# Patient Record
Sex: MALE | Race: WHITE | NOT HISPANIC OR LATINO | URBAN - METROPOLITAN AREA
[De-identification: names, ages, dates, MRNs, and addresses within clinical notes are randomized per-mention and may not be internally consistent; named-entity substitution may affect disease eponyms.]

---

## 2017-06-28 ENCOUNTER — OUTPATIENT (OUTPATIENT)
Dept: OUTPATIENT SERVICES | Facility: HOSPITAL | Age: 37
LOS: 1 days | End: 2017-06-28
Payer: MEDICARE

## 2017-06-28 VITALS
RESPIRATION RATE: 14 BRPM | OXYGEN SATURATION: 98 % | DIASTOLIC BLOOD PRESSURE: 80 MMHG | TEMPERATURE: 97 F | SYSTOLIC BLOOD PRESSURE: 137 MMHG | WEIGHT: 171.96 LBS | HEART RATE: 70 BPM | HEIGHT: 64 IN

## 2017-06-28 DIAGNOSIS — I66.9 OCCLUSION AND STENOSIS OF UNSPECIFIED CEREBRAL ARTERY: ICD-10-CM

## 2017-06-28 PROCEDURE — 33284: CPT

## 2017-06-28 PROCEDURE — 93005 ELECTROCARDIOGRAM TRACING: CPT

## 2017-06-28 PROCEDURE — 93010 ELECTROCARDIOGRAM REPORT: CPT

## 2017-06-28 RX ORDER — CHOLECALCIFEROL (VITAMIN D3) 125 MCG
1 CAPSULE ORAL
Qty: 0 | Refills: 0 | COMMUNITY

## 2017-06-28 RX ORDER — SODIUM CHLORIDE 9 MG/ML
3 INJECTION INTRAMUSCULAR; INTRAVENOUS; SUBCUTANEOUS EVERY 8 HOURS
Qty: 0 | Refills: 0 | Status: DISCONTINUED | OUTPATIENT
Start: 2017-06-28 | End: 2017-07-13

## 2017-06-28 NOTE — H&P CARDIOLOGY - HISTORY OF PRESENT ILLNESS
35 yo male PMH CVA, Down syndrome HLD, PFO closure ([facility] with Dr. Gasca), psoriasis presents today for loop explant. Patient denies chest pain, palpitations, SOB. No events seen other than bradycardia. Loop implanted on 10/2015

## 2017-07-05 ENCOUNTER — NON-APPOINTMENT (OUTPATIENT)
Age: 37
End: 2017-07-05

## 2017-07-05 ENCOUNTER — APPOINTMENT (OUTPATIENT)
Dept: ELECTROPHYSIOLOGY | Facility: CLINIC | Age: 37
End: 2017-07-05

## 2017-07-05 VITALS
HEART RATE: 61 BPM | BODY MASS INDEX: 30.04 KG/M2 | DIASTOLIC BLOOD PRESSURE: 77 MMHG | SYSTOLIC BLOOD PRESSURE: 130 MMHG | WEIGHT: 175 LBS | OXYGEN SATURATION: 98 %

## 2019-05-29 ENCOUNTER — APPOINTMENT (OUTPATIENT)
Dept: NEUROLOGY | Facility: CLINIC | Age: 39
End: 2019-05-29
Payer: MEDICARE

## 2019-05-29 VITALS
HEIGHT: 64 IN | BODY MASS INDEX: 30.9 KG/M2 | HEART RATE: 62 BPM | DIASTOLIC BLOOD PRESSURE: 77 MMHG | WEIGHT: 181 LBS | SYSTOLIC BLOOD PRESSURE: 130 MMHG

## 2019-05-29 DIAGNOSIS — Q90.9 DOWN SYNDROME, UNSPECIFIED: ICD-10-CM

## 2019-05-29 DIAGNOSIS — E78.5 HYPERLIPIDEMIA, UNSPECIFIED: ICD-10-CM

## 2019-05-29 PROCEDURE — 99205 OFFICE O/P NEW HI 60 MIN: CPT

## 2019-05-29 NOTE — DISCUSSION/SUMMARY
[Antithrombotic therapy with ___] : antithrombotic therapy with  [unfilled] [FreeTextEntry1] : Impression:\par Patient with 3 events of loss of consciousness etiology unknown. Cardiac work up completed. Brain MRI 2019 disc reviewed in office and shows no evidence of new stroke. Same areas of gliosis right temporoparietal, left parietal, left temporal and cerebellar as seen in last MRI from 2016. Etiology of symptoms high suspicion for seizures. \par \par Plan:\par 1. Initiate Keppra 500mg BID I advice mother and attendant regarding most common side effects including dizziness, somnolence and erratic behavior.\par 2. I advice precautions such as not being alone for prolonged showers, in any water bodies, or situation that if the patient collapses may endanger his life \par 3. EEG awake and asleep \par 4. Evaluation by Epilepsy specialist\par 5. CBC, CMP, TSH, T3, U/A\par 6. Lipid panel to follow up by PCP to adjust Atorvastatin dose LDL goal <70\par 7. Get MRI and MRA official results \par 8. BP control goal <140/90, currently on goal\par 9. Exercise and weight loss\par 10. Tight glycemic control needs follow up for HbA1C\par 11. As for stroke may be followed accordingly if needed\par \par  [Lipid Lowering Therapy] : lipid lowering therapy [Intensive Blood Pressure Control] : intensive blood pressure control [Goals and Counseling] : I have reviewed the goals of stroke risk factor modification. I counseled the patient on measures to reduce stroke risk, including the importance of medication compliance, risk factor control, exercise, healthy diet and avoidance of smoking. I reviewed stroke warning signs and symptoms and appropriate actions to take if such occur. [Patient encouraged to discuss with Primary MD] : I encouraged the patient to discuss these important issues with ~his/her~ primary care doctor

## 2019-05-29 NOTE — REASON FOR VISIT
[Initial Evaluation] : an initial evaluation [Family Member] : family member [Other: _____] : [unfilled] [FreeTextEntry1] : 3 episodes of loss of consciousness

## 2019-05-29 NOTE — HISTORY OF PRESENT ILLNESS
[FreeTextEntry1] : This is a 39y/o male patient with past medical history of Down Syndrome, hyperlipidemia, and ischemic stroke 2015 evaluated at [facility] bilateral middle cerebral artery territory and cerebellar (cerebellar, right perisylvian temporoparietal, left fronto parietal) last evaluated by Dr. Libman \par Concerning secondary stroke prevention measures he is on ASA 81 mg and Atorvastatin  80mg daily. \par Today he comes after 3 events of loss of consciousness for which he was evaluated at [facility] Jan 28,2019. As per attendant from the patients residency group and mother none of the events where witnessed. During the afternoon he collapse and was unresponsive for minutes losing bowel sphincter control. He apparently was taken for a shower and had two episodes of syncope in the shower that prompted a friend of the patient to call EMS. Patient does not recall the events. As I tried to further inquire details about the event patient became anxious and mother too.\par Since January 2019 they denied any other neurological events. \par He has completed cardiac work up for syncope so far unrevealing. As for metabolic etiologies or BP at the time he was evaluated I don’t have the specific details. \par \par Of note he had PFO repaired. LOOP recorder was discontinued. Off Coumadin.\par

## 2019-05-29 NOTE — PHYSICAL EXAM
[FreeTextEntry1] : Neurologic examination:\par Mental status:\par The patient is alert, attentive, and oriented. Speech is dysarthric but fluent with good repetition, comprehension, and naming. \par \par Cranial nerves:\par CN II: Visual fields difficult to asses. \par CN III, IV, VI: At primary gaze, there is no eye deviation.EOMI. No ptosis. \par CN V: Not tested \par CN VII: Face is symmetric with normal eye closure and smile.\par CN VII: Hearing is normal to voice tone\par CN IX, X: . Phonation is normal.\par CN XI: Head turning and shoulder shrug are intact\par CN XII: Tongue is midline with normal movements and no atrophy.\par \par Motor:\par \par There is no pronator drift of out-stretched arms. Muscle bulk and tone are normal. Strength is full bilaterally.\par 	Deltoid	Biceps	Triceps	Hand \par L	5	5	5	5	\par R	5	5	5	5		\par  Hip flexion Hip extension	Knee flexion Knee extension\par L	5	5	5		 5\par R	5	5	5	                 5\par \par \par Sensory:\par Light touch intact in all 4 extremities. \par \par \par Coordination: No nystagmus\par \par Gait/Stance:\par Posture is normal. Gait is steady with normal steps, base, arm swing, and turning. \par \par

## 2019-07-29 ENCOUNTER — APPOINTMENT (OUTPATIENT)
Dept: NEUROLOGY | Facility: CLINIC | Age: 39
End: 2019-07-29
Payer: MEDICARE

## 2019-07-29 PROCEDURE — 95816 EEG AWAKE AND DROWSY: CPT

## 2019-07-29 PROCEDURE — 99215 OFFICE O/P EST HI 40 MIN: CPT

## 2019-07-29 NOTE — DISCUSSION/SUMMARY
[FreeTextEntry1] : Concern for cluster of seizures 1/2019 p strokes in 2015\par left insular and right temporo-parietal lesions\par ddx includes syncope.  cardiac w/u neg.\par REEG today abn, but no spikes.\par \par Cont LEV for now\par restrict unsupervised activities, bathing alone.\par Agree with CEEG x 48hrs\par mother inquired about other methods of monitoring for seizures, embrace watch considered.\par risk of death from seizures is present, though not predictable.\par compliance with Rx recommended.\par discussed safety recommendations.\par  [Symptomatic] : symptomatic [Focal] : focal [Safety Recommendations] : The patient was advised in regards to the risk of seizures and general seizure safety recommendations including not to be bathing alone, climbing to high places and operating heavy machinery. [Compliance with Medications] : The importance of compliance with medications was reinforced. [Medication Side Effects] : High frequency and serious potential medication adverse effects were reviewed with the patient, including but not exclusive to psychiatric effects.  Information sheets on medication side effects were made available to the patient in our clinic.  The patient or advocate agrees to notify us for any concerns.

## 2019-07-29 NOTE — HISTORY OF PRESENT ILLNESS
[FreeTextEntry1] : Per review of records, discussion from home:\par \par 39y/o male with PMHX of Down Syndrome, hyperlipidemia, and ischemic stroke in 2015 evaluated at [facility] bilateral middle cerebral artery territory and cerebellar (cerebellar, right perisylvian temporoparietal, left fronto parietal) w/ complete right sided hemiplegia and left gaze deviation p TPA.  on ASA 81 mg and Atorvastatin 80mg daily. \par \rosie Had three events of loss of consciousness for which he was evaluated at [facility] Jan 28, 2019. none of the events where witnessed. During the afternoon he collapse and was unresponsive for minutes, losing bowel control. He apparently was taken for a shower and had two episodes of LOC in the shower that prompted a friend of the patient to call EMS. Patient does not recall the events/amnestic. \par Since January 2019 they denied any other neurological events. \par \par He has completed cardiac work up for syncope so far unrevealing.  Of note he had PFO repaired. LOOP recorder was discontinued. \par

## 2019-07-29 NOTE — PHYSICAL EXAM
[FreeTextEntry1] : Neurologic examination:\par \par down's facies, calm, attentive\par \par Mental status:\par The patient is alert, attentive, and oriented. Speech is dysarthric but fluent with good repetition, comprehension, and naming 5/5. \par \par Cranial nerves:\par CN II: Visual fields difficult to asses. \par CN III, IV, VI: At primary gaze, there is no eye deviation.EOMI. No ptosis. \par CN V: Not tested \par CN VII: Face is symmetric with normal eye closure and smile.\par CN VII: Hearing is normal to voice tone\par CN IX, X: . Phonation is normal.\par CN XI: Head turning and shoulder shrug are intact\par CN XII: Tongue is midline with normal movements and no atrophy.\par \par Motor:\par There is no pronator drift of out-stretched arms. Muscle bulk and tone are normal. Strength is full bilaterally.\par 	Deltoid	Biceps	Triceps	Hand \par L	5	5	5	5	\par R	5	5	5	5		\par  Hip flexion Hip extension	Knee flexion Knee extension\par L	5	5	5		 5\par R	5	5	5	                 5\par \par Sensory:\par Light touch intact in all 4 extremities. \par \par Coordination: No nystagmus\par \par Gait/Stance:\par Posture is normal. Gait is steady with normal steps, base, arm swing, and turning. \par

## 2019-07-29 NOTE — DATA REVIEWED
[de-identified] : 2016 Rt posterior temporal, and left insular lesions. [de-identified] : FRITZG 2019 Mod background slowing, max right temporal.

## 2019-07-31 ENCOUNTER — OTHER (OUTPATIENT)
Age: 39
End: 2019-07-31

## 2019-09-03 ENCOUNTER — APPOINTMENT (OUTPATIENT)
Dept: NEUROLOGY | Facility: CLINIC | Age: 39
End: 2019-09-03
Payer: MEDICARE

## 2019-09-03 PROCEDURE — 95816 EEG AWAKE AND DROWSY: CPT

## 2019-09-04 PROCEDURE — 95953: CPT

## 2019-09-05 PROCEDURE — 95953: CPT

## 2019-11-01 ENCOUNTER — MEDICATION RENEWAL (OUTPATIENT)
Age: 39
End: 2019-11-01

## 2020-01-22 ENCOUNTER — APPOINTMENT (OUTPATIENT)
Dept: NEUROLOGY | Facility: CLINIC | Age: 40
End: 2020-01-22
Payer: MEDICARE

## 2020-01-22 VITALS
HEART RATE: 68 BPM | WEIGHT: 188 LBS | DIASTOLIC BLOOD PRESSURE: 76 MMHG | HEIGHT: 64 IN | BODY MASS INDEX: 32.1 KG/M2 | SYSTOLIC BLOOD PRESSURE: 112 MMHG

## 2020-01-22 PROCEDURE — 99214 OFFICE O/P EST MOD 30 MIN: CPT

## 2020-01-27 ENCOUNTER — FORM ENCOUNTER (OUTPATIENT)
Age: 40
End: 2020-01-27

## 2020-01-28 ENCOUNTER — OUTPATIENT (OUTPATIENT)
Dept: OUTPATIENT SERVICES | Facility: HOSPITAL | Age: 40
LOS: 1 days | End: 2020-01-28
Payer: MEDICARE

## 2020-01-28 ENCOUNTER — APPOINTMENT (OUTPATIENT)
Dept: MRI IMAGING | Facility: CLINIC | Age: 40
End: 2020-01-28
Payer: MEDICARE

## 2020-01-28 DIAGNOSIS — G81.91 HEMIPLEGIA, UNSPECIFIED AFFECTING RIGHT DOMINANT SIDE: ICD-10-CM

## 2020-01-28 DIAGNOSIS — I63.9 CEREBRAL INFARCTION, UNSPECIFIED: ICD-10-CM

## 2020-01-28 PROCEDURE — 70551 MRI BRAIN STEM W/O DYE: CPT | Mod: 26

## 2020-01-28 PROCEDURE — 70551 MRI BRAIN STEM W/O DYE: CPT

## 2020-01-29 NOTE — HISTORY OF PRESENT ILLNESS
[FreeTextEntry1] : Per records from home, feels well,  walking no change,  no falls.  no seizures.\par With mother and home care worker today.  no weakness\par Memory ok, thinking stable per mother/pt.\par \par PMHx:\par 40y/o male with PMHX of Down Syndrome, hyperlipidemia, and ischemic stroke in 2015 evaluated at [facility] bilateral middle cerebral artery territory and cerebellar (cerebellar, right perisylvian temporoparietal, left fronto parietal) w/ complete right sided hemiplegia and left gaze deviation p TPA.  on ASA 81 mg and Atorvastatin 80mg daily. \par \par Had three events of loss of consciousness for which he was evaluated at [facility] Jan 28, 2019. none of the events where witnessed. During the afternoon he collapse and was unresponsive for minutes, losing bowel control. He apparently was taken for a shower and had two episodes of LOC in the shower that prompted a friend of the patient to call EMS. Patient does not recall the events/amnestic. \par Since January 2019 they denied any other neurological events. \par \par He has completed cardiac work up for syncope so far unrevealing.  Of note he had PFO repaired. LOOP recorder was discontinued. \par

## 2020-01-29 NOTE — DISCUSSION/SUMMARY
[Safety Recommendations] : The patient was advised in regards to the risk of seizures and general seizure safety recommendations including not to be bathing alone, climbing to high places and operating heavy machinery. [Symptomatic] : symptomatic [Focal] : focal [Compliance with Medications] : The importance of compliance with medications was reinforced. [Medication Side Effects] : High frequency and serious potential medication adverse effects were reviewed with the patient, including but not exclusive to psychiatric effects.  Information sheets on medication side effects were made available to the patient in our clinic.  The patient or advocate agrees to notify us for any concerns. [FreeTextEntry1] : Down's syndrome.  \par Concern for cluster of seizures 1/2019 p strokes in 2015\par left insular and right temporo-parietal lesions on MRI, atrophy.\par ddx includes syncope.  cardiac w/u neg.\par REEG abn, slowing, but no spikes.\par CEEG x 48hrs\par \par Cont LEV for now low dose\par restrict unsupervised activities, bathing alone.\par mother inquired about other methods of monitoring for seizures, embrace watch considered.\par risk of death from seizures is present, though not predictable.\par compliance with Rx recommended.\par discussed safety recommendations.\par \par Recent incontinence, no AMS/memory decline, or gait abn.  \par MRI to evaluate for ventriculomegaly/NPH.\par \par Continue on ASA, lipitor, LEV.\par

## 2020-01-29 NOTE — PHYSICAL EXAM
[FreeTextEntry1] : Neurologic examination:\par \par down's facies, calm, attentive\par \par Mental status:\par The patient is alert, attentive, and oriented. Speech is dysarthric but fluent with good repetition, comprehension, and naming 5/5.  spells [place]LD M[place]LD, unable to do backwards 1/5. recall 2/3\par \par Cranial nerves:\par CN II: Visual fields difficult to asses. \par CN III, IV, VI: At primary gaze, there is no eye deviation.EOMI. No ptosis. \par CN V: Not tested \par CN VII: Face is symmetric with normal eye closure and smile.\par CN VII: Hearing is normal to voice tone\par CN IX, X: . Phonation is normal.\par CN XI: Head turning and shoulder shrug are intact\par CN XII: Tongue is midline with normal movements and no atrophy.\par \par Motor:\par There is no pronator drift of out-stretched arms. Muscle bulk and tone are normal. Strength is full bilaterally.\par 	Deltoid	Biceps	Triceps	Hand \par L	5	5	5	5	\par R	5	5	5	5		\par  Hip flexion Hip extension	Knee flexion Knee extension\par L	5	5	5		 5\par R	5	5	5	                 5\par \par Sensory:\par Light touch intact in all 4 extremities. \par \par Coordination: No nystagmus\par \par Gait/Stance:\par Posture is normal. Gait is steady with normal steps, base, arm swing, and turning. \par

## 2020-01-29 NOTE — DATA REVIEWED
[de-identified] : 2016 Rt posterior temporal, and left insular lesions. [de-identified] : FRITZG 2019 Mod background slowing, max right temporal.

## 2020-07-22 ENCOUNTER — APPOINTMENT (OUTPATIENT)
Dept: NEUROLOGY | Facility: CLINIC | Age: 40
End: 2020-07-22
Payer: MEDICARE

## 2020-07-22 VITALS
SYSTOLIC BLOOD PRESSURE: 96 MMHG | BODY MASS INDEX: 30.56 KG/M2 | HEART RATE: 71 BPM | DIASTOLIC BLOOD PRESSURE: 58 MMHG | HEIGHT: 64 IN | WEIGHT: 179 LBS

## 2020-07-22 VITALS — TEMPERATURE: 98 F

## 2020-07-22 PROCEDURE — 99214 OFFICE O/P EST MOD 30 MIN: CPT

## 2020-07-22 NOTE — DISCUSSION/SUMMARY
[Focal] : focal [FreeTextEntry1] : Down's syndrome.  \par Concern for cluster of seizures 1/2019 p strokes in 2015\par left insular and right temporo-parietal lesions on MRI, atrophy.\par ddx includes syncope.  cardiac w/u neg.\par REEG abn, slowing, but no spikes.\par CEEG x 48hrs\par \par Cont LEV for now low dose\par restrict unsupervised activities, bathing alone.\par mother inquired about other methods of monitoring for seizures, embrace watch considered.\par risk of death from seizures is present, though not predictable.\par compliance with Rx recommended.\par discussed safety recommendations.\par \par Recent incontinence, no back pain, no AMS/memory decline, or gait abn.  \par MRI to evaluate for ventriculomegaly/NPH 1/2020 neg.\par \par Continue on ASA, lipitor, LEV.\par  [Symptomatic] : symptomatic [Medication Side Effects] : High frequency and serious potential medication adverse effects were reviewed with the patient, including but not exclusive to psychiatric effects.  Information sheets on medication side effects were made available to the patient in our clinic.  The patient or advocate agrees to notify us for any concerns. [Compliance with Medications] : The importance of compliance with medications was reinforced. [Safety Recommendations] : The patient was advised in regards to the risk of seizures and general seizure safety recommendations including not to be bathing alone, climbing to high places and operating heavy machinery.

## 2020-07-22 NOTE — DATA REVIEWED
[de-identified] : 2016 Rt posterior temporal, and left insular lesions. [de-identified] : FRITZG 2019 Mod background slowing, max right temporal.

## 2020-07-22 NOTE — HISTORY OF PRESENT ILLNESS
[FreeTextEntry1] : Per records from home, feels well,  walking no change,  no falls.  no seizures.\par With mother and home care worker today.  no weakness\par Memory ok, thinking stable per mother/pt.\par Treadmill x 35min/day.\par 2 weeks ago, episode of fecal incontinence.  nl diet.  no use of miralax as of late. \par Sometimes doesn't feel need to go, and has BM.  no back pain, no pain in groin, leg motion/numbness issues.\par \par PMHx:\par 38y/o male with PMHX of Down Syndrome, hyperlipidemia, and ischemic stroke in 2015 evaluated at [facility] bilateral middle cerebral artery territory and cerebellar (cerebellar, right perisylvian temporoparietal, left fronto parietal) w/ complete right sided hemiplegia and left gaze deviation p TPA.  on ASA 81 mg and Atorvastatin 80mg daily. \par \rosie Had three events of loss of consciousness for which he was evaluated at [facility] Jan 28, 2019. none of the events where witnessed. During the afternoon he collapse and was unresponsive for minutes, losing bowel control. He apparently was taken for a shower and had two episodes of LOC in the shower that prompted a friend of the patient to call EMS. Patient does not recall the events/amnestic. \par Since January 2019 they denied any other neurological events. \par \par He has completed cardiac work up for syncope so far unrevealing.  Of note he had PFO repaired. LOOP recorder was discontinued. \par

## 2020-07-22 NOTE — PHYSICAL EXAM
[FreeTextEntry1] : Neurologic examination:\par \par down's facies, calm, attentive\par \par Mental status:  Orientation 5/5 time, 3/5 place (baseline)\par The patient is alert, attentive, and oriented. Speech is dysarthric but fluent with good repetition, comprehension, and naming 5/5.  spells [place], unable to do backwards 1/5. reg 2/3, multiple trials, recall 2-3/3\par \par Cranial nerves:\par CN II: Visual fields difficult to asses. \par CN III, IV, VI: At primary gaze, there is no eye deviation.EOMI. No ptosis. \par CN V: Not tested \par CN VII: Face is symmetric with normal eye closure and smile.\par CN VII: Hearing is normal to voice tone\par CN IX, X: . Phonation is normal.\par CN XI: Head turning and shoulder shrug are intact\par CN XII: Tongue is midline with normal movements and no atrophy.\par \par Motor:\par There is no pronator drift of out-stretched arms. Muscle bulk and tone are normal. Strength is full bilaterally.\par 	Deltoid	Biceps	Triceps	Hand \par L	5	5	5	5	\par R	5	5	5	5		\par  Hip flexion Hip extension	Knee flexion Knee extension\par L	5	5	5		 5\par R	5	5	5	                 5\par \par Sensory:\par Light touch intact in all 4 extremities. \par \par Coordination: No nystagmus/ slowed HTS/HT dexterity bilat\par \par Gait/Stance:\par Posture is normal. Gait is slowed but steady with deliberate steps, base, arm swing, and turning.  Unable to tandem, +romberg\par \par DTR bi, knee nl

## 2020-09-22 ENCOUNTER — APPOINTMENT (OUTPATIENT)
Dept: NEUROLOGY | Facility: CLINIC | Age: 40
End: 2020-09-22
Payer: MEDICARE

## 2020-09-22 VITALS
SYSTOLIC BLOOD PRESSURE: 119 MMHG | BODY MASS INDEX: 30.73 KG/M2 | DIASTOLIC BLOOD PRESSURE: 76 MMHG | HEIGHT: 64 IN | WEIGHT: 180 LBS | HEART RATE: 67 BPM

## 2020-09-22 VITALS — TEMPERATURE: 98 F

## 2020-09-22 PROCEDURE — 99214 OFFICE O/P EST MOD 30 MIN: CPT

## 2020-09-22 PROCEDURE — 95816 EEG AWAKE AND DROWSY: CPT

## 2020-09-22 NOTE — DISCUSSION/SUMMARY
[Focal] : focal [Symptomatic] : symptomatic [Safety Recommendations] : The patient was advised in regards to the risk of seizures and general seizure safety recommendations including not to be bathing alone, climbing to high places and operating heavy machinery. [Compliance with Medications] : The importance of compliance with medications was reinforced. [Medication Side Effects] : High frequency and serious potential medication adverse effects were reviewed with the patient, including but not exclusive to psychiatric effects.  Information sheets on medication side effects were made available to the patient in our clinic.  The patient or advocate agrees to notify us for any concerns. [FreeTextEntry1] : Down's syndrome.  \par Concern for cluster of seizures 1/2019 p strokes in 2015\par left insular and right temporo-parietal lesions on MRI, atrophy.\par ddx includes syncope.  cardiac w/u neg.\par REEG 7/2019 abn, bilateral slowing, but no spikes.\par CEEG x 48hrs 9/2019 Abnormal EEG in the awake, drowsy and asleep states.\par - Multiple push-buttons without abnormal EEG correlate. \par - Mild generalized slowing, most prominent bi-temporal and more on the right compared the left\par \par Episode of hypoglyemia to 40's with AMS.  Agree with FSG testing and endocrine eval.\par \par Cont LEV for now low dose\par restrict unsupervised activities, bathing alone.\par risk of death from seizures is present, though not predictable.\par compliance with Rx recommended.\par discussed safety recommendations.\par \par Recent incontinence, no back pain, no AMS/memory decline, or gait abn.  \par MRI to evaluate for ventriculomegaly/NPH 1/2020 neg.\par \par Continue on ASA, lipitor, LEV.\par \par f/u 4mo\par \par

## 2020-09-22 NOTE — DATA REVIEWED
[de-identified] : 2016 Rt posterior temporal, and left insular lesions. [de-identified] : FRITZG 2019 Mod background slowing, max right temporal.

## 2020-09-22 NOTE — PHYSICAL EXAM
[FreeTextEntry1] : Neurologic examination:\par \par down's facies, calm, attentive\par \par Mental status:  Orientation 5/5 time, 3/5 place (baseline)\par The patient is alert, attentive, and oriented. Speech is dysarthric but fluent with good repetition, comprehension, and naming 5/5.  spells [place], unable to do backwards 1/5. reg 2/3, multiple trials, recall 2-3/3.  recalls 3 items from last visit.\par \par Cranial nerves:\par CN II: Visual fields difficult to asses. \par CN III, IV, VI: At primary gaze, there is no eye deviation.EOMI. No ptosis. \par CN V: Not tested \par CN VII: Face is symmetric with normal eye closure and smile.\par CN VII: Hearing is normal to voice tone\par CN IX, X: . Phonation is normal.\par CN XI: Head turning and shoulder shrug are intact\par CN XII: Tongue is midline with normal movements and no atrophy.\par \par Motor:\par There is no pronator drift of out-stretched arms. Muscle bulk and tone are normal. Strength is full bilaterally.\par 	Deltoid	Biceps	Triceps	Hand \par L	5	5	5	5	\par R	5	5	5	5		\par  Hip flexion Hip extension	Knee flexion Knee extension\par L	5	5	5		 5\par R	5	5	5	                 5\par \par Sensory:\par Light touch intact in all 4 extremities. \par \par Coordination: No nystagmus/ slowed HTS/HT dexterity bilat\par \par Gait/Stance:\par Posture is normal. Gait is slowed but steady with deliberate steps, base, arm swing, and turning.  Unable to tandem, +romberg\par \par DTR bi, knee nl

## 2020-10-29 NOTE — HISTORY OF PRESENT ILLNESS
Continuity of Care Form    Patient Name: Izzy Enriquez   :  1945  MRN:  [id number]    Admit date:  10/27/2020  Discharge date:  11/3/20    Code Status Order: Full Code   Advance Directives:   Advance Care Flowsheet Documentation       Date/Time Healthcare Directive Type of Healthcare Directive Copy in [address] Agent's Name Healthcare Agent's Phone Number    10/27/20 1523  Yes, patient has an advance directive for healthcare treatment  Durable power of  for health care  No, copy requested from family -- -- --            Admitting Physician:  Samia Bean MD  PCP: Alem Joel DO    Discharging Nurse: Mushtaq -7 Unit/Room#: 7635/2989-B  Discharging Unit Phone Number: ***    Emergency Contact:   Extended Emergency Contact Information  Primary Emergency Contact: Laura Vazquez  Address: [address] Phone: [phone]  Mobile Phone: [phone]  Relation: Spouse   needed?  No  Secondary Emergency Contact: Josh Vazquez  Address: [address] [place] [address] Phone: [phone]  Relation: Child    Past Surgical History:  Past Surgical History:   Procedure Laterality Date    AV FISTULA CREATION Left     Dr. Maxine Castleman Left 2019    2 x Dr. Cristine Mukherjee, [facility]    HIP SURGERY Left 10/16/2020    HIP HEMIARTHROPLASTY performed by Marilee Juárez MD at [facility]      Aortobifemoral bypass for claudicatio - Dr. Emre Browning N/A 2020    EGD ESOPHAGOGASTRODUODENOSCOPY WITH ANTRAL BIOPSY performed by Bertha Gutiérrez MD at [facility] N/A 10/28/2020    EGD ESOPHAGOGASTRODUODENOSCOPY performed by Bertha Gutiérrez MD at [facility] ENDOSCOPY       Immunization History: There is no immunization history on file for this patient.     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    Fracture of femoral neck, left, closed ([facility] S72.002A    History of left hip hemiarthroplasty K02.187    Severe protein-calorie malnutrition POA E43    GI bleeding K92.2       Isolation/Infection:   Isolation            No Isolation          Patient Infection Status       Infection Onset Added Last Indicated Last Indicated By Review Planned Expiration Resolved Resolved By    None active    Resolved    COVID-19 Rule Out 10/28/20 10/28/20 10/28/20 COVID-19 (Ordered)   10/28/20 Rule-Out Test Resulted    COVID-19 Rule Out 10/20/20 10/20/20 10/20/20 COVID-19 (Ordered)   10/20/20 Rule-Out Test Resulted    COVID-19 Rule Out 20 Covid-19 Ambulatory (Ordered)   20 Rule-Out Test Resulted    COVID-19 20 Covid-19 Ambulatory   20     DETECTED 2020    COVID-19 Rule Out 20 COVID-19 (Ordered)   20 Rule-Out Test Resulted    DETECTED 2020            Nurse Assessment:  Last Vital Signs: BP (!) 122/57   Pulse 84   Temp 98.3 °F (36.8 °C) (Temporal)   Resp 16   Ht 5' 8\" (1.727 m)   Wt 139 lb 12.4 oz (63.4 kg)   SpO2 93%   BMI 21.25 kg/m²     Last documented pain score (0-10 scale): Pain Level: 6  Last Weight:   Wt Readings from Last 1 Encounters:   10/28/20 139 lb 12.4 oz (63.4 kg)     Mental Status:  oriented and alert    IV Access:  - PICC - site  R Basilic, insertion date: 10/17/20  - Dialysis Catheter  - site  left, insertion date: 10/16/20    Nursing Mobility/ADLs:  Walking   Dependent  Transfer  [address] [place] [address] Dr  Dependent  Med Delivery   whole    Wound Care Documentation and Therapy:  Wound 10/12/20 Arm Left;Posterior;Proximal (Active)   Number of days: 17       Wound 10/12/20 Arm Left;Posterior;Distal (Active)   Number of days: 17       Wound 10/12/20 Face Left;Upper above left eyebrow (Active)   Number of days: 17       Wound 10/27/20 Coccyx (Active)   Dressing/Treatment Pharmaceutical agent (see MAR) 10/29/20 0800   Wound Length (cm) 2.5 cm 10/28/20 0900   Wound Width (cm) 0.8 cm 10/28/20 0900   Wound Surface Area (cm^2) 2 cm^2 10/28/20 0900   Change in Wound Size % (l*w) 0 10/28/20 0900   Number of days: 1        Elimination:  Continence:   · Bowel: Yes  · Bladder: *** Anuric  Urinary Catheter: None   Colostomy/Ileostomy/Ileal Conduit: No       Date of Last BM: 11/3/20      Intake/Output Summary (Last 24 hours) at 10/29/2020 1206  Last data filed at 10/29/2020 0800  Gross per 24 hour   Intake 2933 ml   Output 2150 ml   Net 783 ml     I/O last 3 completed shifts: In: 5898 [I.V.:2248; Blood:355]  Out: 2150     Safety Concerns:     History of Falls (last 30 days) and At Risk for Falls    Impairments/Disabilities:      Vision and Hearing [place] and blind in L eye    Nutrition Therapy:  Current Nutrition Therapy:   - Oral Diet:  Renal    Routes of Feeding: Oral  Liquids: No Restrictions  Daily Fluid Restriction: no  Last Modified Barium Swallow with Video (Video Swallowing Test): not done    Treatments at the Time of Hospital Discharge:   Respiratory Treatments: na  Oxygen Therapy:  is not on home oxygen therapy.   Ventilator:    - No ventilator support    Rehab Therapies: Physical Therapy and Occupational Therapy  Weight Bearing Status/Restrictions: No weight bearing restirctions  Other Medical Equipment (for information only, NOT a DME order):  walker  Other Treatments:     Patient's personal belongings (please select all that are sent with patient):  Glasses, Dentures upper and lower    RN SIGNATURE:  {Esignature:[id number]}    CASE MANAGEMENT/SOCIAL WORK SECTION    Inpatient Status Date: ***    Readmission Risk Assessment Score:  Readmission Risk              Risk of Unplanned Readmission:        40           Discharging to Facility/ Agency   · Name: [facility]ina Rojas  · Address:  · Phone:  · Fax:    Dialysis Facility (if applicable)   · Name:  · Address:  · Dialysis Schedule:  · Phone:  · Fax:    / signature:       PHYSICIAN SECTION    Prognosis: {Prognosis:5750018965}    Condition at Discharge: [address] [place] [address] Patient Condition:[id number]}    Rehab Potential (if transferring to Rehab): {Prognosis:7830023794}    Recommended Labs or Other Treatments After Discharge: ***    Physician Certification: I certify the above information and transfer of Tavares Mcclain  is necessary for the continuing treatment of the diagnosis listed and that he requires Merged with [facility] for less 30 days.      Update Admission H&P: {CHP DME Changes in DORMU:720055890}    PHYSICIAN SIGNATURE:  Electronically signed by Christen Dandy, MD on 11/3/20 at 10:44 AM EST [FreeTextEntry1] : update:  Episode of hypoglycemia to 40's, went to [facility]\par at the time had some c/o neck ache/stiffness, resolved.  walking was off, but better now. lasted one day.\par CT L fronto parietal, R parietal lobe.  wm changes\par With mother and home care worker today.  \par Memory ok, thinking stable per mother/pt/caregiver.\par \par Treadmill x 35min/day.\par no back pain, no pain in groin, leg motion/numbness issues.\par \par PMHx:\par 38y/o male with PMHX of Down Syndrome, hyperlipidemia, and ischemic stroke in 2015 evaluated at [facility] bilateral middle cerebral artery territory and cerebellar (cerebellar, right perisylvian temporoparietal, left fronto parietal) w/ complete right sided hemiplegia and left gaze deviation p TPA.  on ASA 81 mg and Atorvastatin 80mg daily. \par \par Had three events of loss of consciousness for which he was evaluated at [facility] Jan 28, 2019. none of the events where witnessed. During the afternoon he collapse and was unresponsive for minutes, losing bowel control. He apparently was taken for a shower and had two episodes of LOC in the shower that prompted a friend of the patient to call EMS. Patient does not recall the events/amnestic. \par Since January 2019 they denied any other neurological events. \par \par He has completed cardiac work up for syncope so far unrevealing.  Of note he had PFO repaired. LOOP recorder was discontinued. \par

## 2021-02-23 ENCOUNTER — APPOINTMENT (OUTPATIENT)
Dept: NEUROLOGY | Facility: CLINIC | Age: 41
End: 2021-02-23
Payer: MEDICARE

## 2021-02-23 VITALS — WEIGHT: 189 LBS | BODY MASS INDEX: 32.44 KG/M2

## 2021-02-23 VITALS — TEMPERATURE: 96.7 F

## 2021-02-23 DIAGNOSIS — G81.91 HEMIPLEGIA, UNSPECIFIED AFFECTING RIGHT DOMINANT SIDE: ICD-10-CM

## 2021-02-23 DIAGNOSIS — I63.9 CEREBRAL INFARCTION, UNSPECIFIED: ICD-10-CM

## 2021-02-23 PROCEDURE — 99213 OFFICE O/P EST LOW 20 MIN: CPT

## 2021-02-23 NOTE — DISCUSSION/SUMMARY
[Focal] : focal [Symptomatic] : symptomatic [Safety Recommendations] : The patient was advised in regards to the risk of seizures and general seizure safety recommendations including not to be bathing alone, climbing to high places and operating heavy machinery. [Compliance with Medications] : The importance of compliance with medications was reinforced. [Medication Side Effects] : High frequency and serious potential medication adverse effects were reviewed with the patient, including but not exclusive to psychiatric effects.  Information sheets on medication side effects were made available to the patient in our clinic.  The patient or advocate agrees to notify us for any concerns. [FreeTextEntry1] : Down's syndrome.  \par Concern for cluster of seizures 1/2019 p strokes in 2015\par left insular and right temporo-parietal lesions on MRI, atrophy.\par ddx includes syncope.  cardiac w/u neg.\par REEG 7/2019 abn, bilateral slowing, but no spikes.\par CEEG x48hrs 9/2019 Abnormal EEG in the awake, drowsy and asleep states.\par - Multiple push-buttons without abnormal EEG correlate. \par - Mild generalized slowing, most prominent bi-temporal and more on the right compared the left\par REEG 9/2020 R frontal sharp waves described.\par \par Prior Episode in 2020 of hypoglyemia to 40's with AMS.  Agree with FSG testing and endocrine eval.\par \par Cont LEV for now low dose\par restrict unsupervised activities, bathing alone.\par risk of death from seizures is present, though not predictable.\par compliance with Rx recommended.\par discussed safety recommendations.\par \par -No incontinence, no back pain, no AMS/memory decline.  \par -MRI to evaluate for ventriculomegaly/NPH 1/2020 neg.\par -Mild chronic gait instability p stroke, some intermittent exacerbation as of late. MRI stable 1/2020.  PT requested.\par \par Continue on ASA, lipitor, LEV.\par \par f/u 4mo\par

## 2021-02-23 NOTE — DATA REVIEWED
[de-identified] : 2016 Rt posterior temporal, and left insular lesions. [de-identified] : FRITZG 2019 Mod background slowing, max right temporal.

## 2021-02-23 NOTE — HISTORY OF PRESENT ILLNESS
[FreeTextEntry1] : update:   going to [employer], zoom with family, , exercises x40min\par No seizures.  somewhat more unsteady gait lately per aide.\par \par 2020 Episode of hypoglycemia to 40's, went to [facility]\par at the time had some c/o neck ache/stiffness, resolved.  walking was off, but better now. lasted one day.\par CT L fronto parietal, R parietal lobe.  wm changes\par \par With mother and home care worker today.  \par Memory ok, thinking stable per mother/pt/caregiver.\par No back pain, no pain in groin, leg motion/numbness issues.\par \par PMHx:\par 41 y/o male with PMHX of Down Syndrome, hyperlipidemia, and ischemic stroke in 2015 evaluated at [facility] bilateral middle cerebral artery territory and cerebellar (cerebellar, right perisylvian temporoparietal, left fronto parietal) w/ complete right sided hemiplegia and left gaze deviation p TPA.  on ASA 81 mg and Atorvastatin 80mg daily. \par \par Had three events of loss of consciousness for which he was evaluated at [facility] Jan 28, 2019. none of the events where witnessed. During the afternoon he collapse and was unresponsive for minutes, losing bowel control. He apparently was taken for a shower and had two episodes of LOC in the shower that prompted a friend of the patient to call EMS. Patient does not recall the events/amnestic. \par Since January 2019 they denied any other neurological events. \par \par He has completed cardiac work up for syncope so far unrevealing.  Of note he had PFO repaired. LOOP recorder was discontinued. \par

## 2021-02-23 NOTE — PHYSICAL EXAM
[FreeTextEntry1] : Neurologic examination:\par \par down's facies, calm, attentive, pleasant\par \par Mental status:  Orientation 5/5 time, 3/5 place (baseline)\par The patient is alert, attentive, and oriented. Speech is dysarthric but fluent with good repetition, comprehension, and naming 5/5.  spells WORLD DORLD, unable to do backwards 1/5. reg 2/3, multiple trials, recall 2-3/3.  recalls 3 items from last visit.\par \par Cranial nerves:\par CN II: Visual fields difficult to asses. \par CN III, IV, VI: At primary gaze, there is no eye deviation.EOMI. No ptosis. \par CN V: Not tested \par CN VII: Face is symmetric with normal eye closure and smile.\par CN VII: Hearing is normal to voice tone\par CN IX, X: . Phonation is normal.\par CN XI: Head turning and shoulder shrug are intact\par CN XII: Tongue is midline with normal movements and no atrophy.\par \par Motor:\par There is no pronator drift of out-stretched arms. Muscle bulk and tone are normal. Strength is full bilaterally.\par 	Deltoid	Biceps	Triceps	Hand \par L	5	5	5	5	\par R	5	5	5	5		\par  Hip flexion Hip extension	Knee flexion Knee extension\par L	5	5	5		 5\par R	5	5	5	                 5\par \par Sensory:\par Light touch intact in all 4 extremities. \par \par Coordination: No nystagmus/ slowed HTS/HT dexterity bilat\par \par Gait/Stance:\par Posture is normal. Gait is slowed but steady with deliberate steps, base, arm swing, and turning.  Unable to tandem, +romberg\par \par DTR bi, knee nl

## 2021-11-17 ENCOUNTER — APPOINTMENT (OUTPATIENT)
Dept: NEUROLOGY | Facility: CLINIC | Age: 41
End: 2021-11-17
Payer: MEDICARE

## 2021-11-17 VITALS
DIASTOLIC BLOOD PRESSURE: 73 MMHG | BODY MASS INDEX: 33.63 KG/M2 | WEIGHT: 197 LBS | HEIGHT: 64 IN | SYSTOLIC BLOOD PRESSURE: 116 MMHG | HEART RATE: 62 BPM

## 2021-11-17 PROCEDURE — 99213 OFFICE O/P EST LOW 20 MIN: CPT

## 2021-11-17 NOTE — HISTORY OF PRESENT ILLNESS
[FreeTextEntry1] : update:   going to [employer], , exercises daily.\par No seizures.  somewhat more unsteady gait lately per aide.\par 18 lbs wt gain.\par \par With mother and home care worker today.  \par No back pain, no pain in groin, leg motion/numbness issues.\par Occ nausea.  Mood ok.\par Memory ok, thinking stable per mother/pt/caregiver.\par \par 2020 Episode of hypoglycemia to 40's, went to [facility]\par At the time had some c/o neck ache/stiffness, resolved.  walking was off, but better now. lasted one day.\par CT L fronto parietal, R parietal lobe.  wm changes\par \par PMHx:\par 42 y/o male with PMHX of Down Syndrome, hyperlipidemia, and ischemic stroke in 2015 evaluated at [facility] bilateral middle cerebral artery territory and cerebellar (cerebellar, right perisylvian temporoparietal, left fronto parietal) w/ complete right sided hemiplegia and left gaze deviation p TPA.  on ASA 81 mg and Atorvastatin 80mg daily. \par \par Had three events of loss of consciousness for which he was evaluated at [facility] Jan 28, 2019. none of the events where witnessed. During the afternoon he collapse and was unresponsive for minutes, losing bowel control. He apparently was taken for a shower and had two episodes of LOC in the shower that prompted a friend of the patient to call EMS. Patient does not recall the events/amnestic. \par Since January 2019 they denied any other neurological events. \par \par He has completed cardiac work up for syncope so far unrevealing.  Of note he had PFO repaired. LOOP recorder was discontinued. \par

## 2021-11-17 NOTE — DISCUSSION/SUMMARY
[Focal] : focal [Symptomatic] : symptomatic [Safety Recommendations] : The patient was advised in regards to the risk of seizures and general seizure safety recommendations including not to be bathing alone, climbing to high places and operating heavy machinery. [Compliance with Medications] : The importance of compliance with medications was reinforced. [Medication Side Effects] : High frequency and serious potential medication adverse effects were reviewed with the patient, including but not exclusive to psychiatric effects.  Information sheets on medication side effects were made available to the patient in our clinic.  The patient or advocate agrees to notify us for any concerns. [FreeTextEntry1] : Down's syndrome.  \par Concern for cluster of seizures 1/2019 p strokes in 2015\par left insular and right temporo-parietal lesions on MRI, atrophy.\par ddx includes syncope.  cardiac w/u neg.\par REEG 7/2019 abn, bilateral slowing, but no spikes.\par CEEG x48hrs 9/2019 Abnormal EEG in the awake, drowsy and asleep states.\par - Multiple push-buttons without abnormal EEG correlate. \par - Mild generalized slowing, most prominent bi-temporal and more on the right compared the left\par REEG 9/2020 R frontal sharp waves described.\par \par Prior Episode in 2020 of hypoglyemia to 40's with AMS.  Agree with FSG testing and endocrine eval.\par Gait unsteadiness, some h/o R knee pain, better as of late.  exercising\par \par Cont LEV for now low dose\par restrict unsupervised activities, bathing alone.\par risk of death from seizures is present, though not predictable.\par compliance with Rx recommended.\par discussed safety recommendations.\par \par -No incontinence, no back pain, no AMS/memory decline.  \par -MRI to evaluate for ventriculomegaly/NPH 1/2020 neg.\par -Mild chronic gait instability p stroke, some intermittent exacerbation as of late, right knee pain. MRI stable 1/2020.  PT requested.\par \par Continue on ASA, lipitor, LEV.\par \par f/u 6mo\par

## 2021-11-17 NOTE — DATA REVIEWED
[de-identified] : 2016 Rt posterior temporal, and left insular lesions. [de-identified] : FRITZG 2019 Mod background slowing, max right temporal.

## 2021-11-17 NOTE — PHYSICAL EXAM
[FreeTextEntry1] : Neurologic examination:\par \par down's facies, calm, attentive, pleasant\par \par Mental status:  Orientation 5/5 time, 3/5 place (baseline)\par The patient is alert, attentive, and oriented. Speech is dysarthric but fluent with good repetition, comprehension, and naming 5/5.  spells WORLD DOlrD, unable to do backwards 1/5. reg 2/3, multiple trials, recall 2-3/3.  recalls 3 items from last visit.\par \par Cranial nerves:\par CN II: Visual fields difficult to asses. \par CN III, IV, VI: At primary gaze, there is no eye deviation.EOMI. No ptosis. \par CN V: Not tested \par CN VII: Face is symmetric with normal eye closure and smile.\par CN VII: Hearing is normal to voice tone\par CN IX, X: . Phonation is normal.\par CN XI: Head turning and shoulder shrug are intact\par CN XII: Tongue is midline with normal movements and no atrophy.\par \par Motor:\par There is no pronator drift of out-stretched arms. Muscle bulk and tone are normal. Strength is full bilaterally.\par 	Deltoid	Biceps	Triceps	Hand \par L	5	5	5	5	\par R	5	5	5	5		\par  Hip flexion Hip extension	Knee flexion Knee extension\par L	5	5	5		 5\par R	5	5	5	                 5\par \par Sensory:\par Light touch intact in all 4 extremities. \par \par Coordination: No nystagmus/ slowed HTS/HT dexterity bilat\par \par Gait/Stance:\par Posture is normal. Gait is slowed but steady with deliberate steps, base, arm swing, and turning.  Unable to tandem, +romberg\par \par DTR bi, knee nl

## 2022-05-17 ENCOUNTER — APPOINTMENT (OUTPATIENT)
Dept: NEUROLOGY | Facility: CLINIC | Age: 42
End: 2022-05-17
Payer: MEDICARE

## 2022-05-17 VITALS
HEART RATE: 60 BPM | BODY MASS INDEX: 33.46 KG/M2 | DIASTOLIC BLOOD PRESSURE: 68 MMHG | WEIGHT: 196 LBS | HEIGHT: 64 IN | SYSTOLIC BLOOD PRESSURE: 132 MMHG

## 2022-05-17 PROCEDURE — 99213 OFFICE O/P EST LOW 20 MIN: CPT

## 2022-05-17 NOTE — DISCUSSION/SUMMARY
[Focal] : focal [Symptomatic] : symptomatic [Safety Recommendations] : The patient was advised in regards to the risk of seizures and general seizure safety recommendations including not to be bathing alone, climbing to high places and operating heavy machinery. [Compliance with Medications] : The importance of compliance with medications was reinforced. [Medication Side Effects] : High frequency and serious potential medication adverse effects were reviewed with the patient, including but not exclusive to psychiatric effects.  Information sheets on medication side effects were made available to the patient in our clinic.  The patient or advocate agrees to notify us for any concerns. [FreeTextEntry1] : Down's syndrome.  \par Concern for cluster of seizures 1/2019 p strokes in 2015\par left insular and right temporo-parietal lesions on MRI, atrophy.\par ddx includes syncope.  cardiac w/u neg.\par REEG 7/2019 abn, bilateral slowing, but no spikes.\par CEEG x48hrs 9/2019 Abnormal EEG in the awake, drowsy and asleep states.\par - Multiple push-buttons without abnormal EEG correlate. \par - Mild generalized slowing, most prominent bi-temporal and more on the right compared the left\par REEG 9/2020 R frontal sharp waves described.\par \par Prior Episode in 2020 of hypoglyemia to 40's with AMS.  \par \par Cont LEV for now low dose\par restrict unsupervised activities, such bathing alone.\par risk of death from seizures is present, though not predictable.\par discussed safety recommendations.\par compliance with Rx recommended.\par \par -No incontinence, no back pain, no AMS/memory decline.  Cognition stable.\par -MRI to evaluate for ventriculomegaly/NPH 1/2020 neg.\par -Mild chronic gait instability p stroke, some intermittent exacerbation as of late, right knee pain. MRI stable 1/2020.  PT requested.  Gait unsteadiness. Exercising\par \par Continue on ASA, lipitor, LEV.\par \par f/u 6mo\par

## 2022-05-17 NOTE — HISTORY OF PRESENT ILLNESS
[FreeTextEntry1] : Rx:  ASA81, lipitor 40mg, LEV 500mg bid, vit D\par \par update:   going to dayhab, , exercises daily, PT, walking.\par No seizures.  Somewhat more unsteady gait lately per aide.\par Wt gain stable.\par \par With mother and home care worker today.  \par No back pain, no pain in groin, leg motion/numbness issues.\par Occ nausea.  Mood ok.\par Memory ok, thinking stable per mother/pt/caregiver.\par \par 2020 Episode of hypoglycemia to 40's, went to [facility]\par At the time had some c/o neck ache/stiffness, resolved.  walking was off, but better now. lasted one day.\par CT L fronto parietal, R parietal lobe.  wm changes\par \par PMHx:\par 42 y/o male with PMHX of Down Syndrome, hyperlipidemia, and ischemic stroke in 2015 evaluated at [facility] bilateral middle cerebral artery territory and cerebellar (cerebellar, right perisylvian temporoparietal, left fronto parietal) w/ complete right sided hemiplegia and left gaze deviation p TPA.  on ASA 81 mg and Atorvastatin 80mg daily. \par \par Had three events of loss of consciousness for which he was evaluated at [facility] Jan 28, 2019. none of the events where witnessed. During the afternoon he collapse and was unresponsive for minutes, losing bowel control. He apparently was taken for a shower and had two episodes of LOC in the shower that prompted a friend of the patient to call EMS. Patient does not recall the events/amnestic. \par Since January 2019 they denied any other neurological events. \par \par He has completed cardiac work up for syncope so far unrevealing.  Of note he had PFO repaired. LOOP recorder was discontinued. \par

## 2022-05-17 NOTE — PHYSICAL EXAM
[FreeTextEntry1] : Neurologic examination:\par \par down's facies, calm, attentive, pleasant\par \par Mental status:  Orientation 4/5 time, 2/5 place (baseline)\par The patient is alert, attentive, and oriented. Speech is dysarthric but fluent with good repetition, comprehension, and naming 5/5.  spells WORLD DOlrD, unable to do backwards 1/5. reg 2/3, multiple trials, recall 2-3/3.  recalls 3 items from last visit.\par \par Cranial nerves:\par CN II: Visual fields difficult to asses. \par CN III, IV, VI: At primary gaze, there is no eye deviation.EOMI. No ptosis. \par CN V: Not tested \par CN VII: Face is symmetric with normal eye closure and smile.\par CN VII: Hearing is normal to voice tone\par CN IX, X: . Phonation is normal.\par CN XI: Head turning and shoulder shrug are intact\par CN XII: Tongue is midline with normal movements and no atrophy.\par \par Motor:\par There is no pronator drift of out-stretched arms. Muscle bulk and tone are normal. Strength is full bilaterally.\par 	Deltoid	Biceps	Triceps	Hand \par L	5	5	5	5	\par R	5	5	5	5		\par  Hip flexion Hip extension	Knee flexion Knee extension\par L	5	5	5		 5\par R	5	5	5	                 5\par \par Sensory:\par Light touch intact in all 4 extremities. \par \par Coordination: No nystagmus/ slowed HTS/HT dexterity bilat\par \par Gait/Stance:\par Posture is normal. Some eversion of foot while walking bilaterally.  Gait is slowed but steady with deliberate steps, base, arm swing, and turning.  Unable to tandem, +romberg\par \par DTR bi, knee 1+ symm

## 2022-05-17 NOTE — DATA REVIEWED
[de-identified] : 2016 Rt posterior temporal, and left insular lesions. [de-identified] : FRITZG 2019 Mod background slowing, max right temporal.

## 2022-11-16 ENCOUNTER — RX RENEWAL (OUTPATIENT)
Age: 42
End: 2022-11-16

## 2023-02-15 ENCOUNTER — APPOINTMENT (OUTPATIENT)
Dept: NEUROLOGY | Facility: CLINIC | Age: 43
End: 2023-02-15
Payer: MEDICARE

## 2023-02-15 VITALS
WEIGHT: 315 LBS | HEIGHT: 64 IN | DIASTOLIC BLOOD PRESSURE: 66 MMHG | SYSTOLIC BLOOD PRESSURE: 116 MMHG | BODY MASS INDEX: 53.78 KG/M2 | HEART RATE: 55 BPM

## 2023-02-15 DIAGNOSIS — G47.9 SLEEP DISORDER, UNSPECIFIED: ICD-10-CM

## 2023-02-15 PROCEDURE — 99213 OFFICE O/P EST LOW 20 MIN: CPT

## 2023-02-15 NOTE — HISTORY OF PRESENT ILLNESS
[FreeTextEntry1] : cellulitis and tachycardia\par  [de-identified] : Foot less red\par feels ok\par on keflex and bactrim\par on metoprolol 25 x 3  [FreeTextEntry1] : Rx:  ASA81, lipitor 40mg, LEV 500mg bid, vit D\par \par update:   no seizures\par going to dayhab exercises daily, PT, walking.\par No seizures.  Somewhat chronically unsteady gait\par Wt gain stable.\par \par With mother and home care worker today.  \par No back pain, no pain in groin, leg motion/numbness issues.\par Mood ok.\par Memory ok, thinking stable per mother/pt/caregiver.\par \par 2020 Episode of hypoglycemia to 40's, [facility] to [facility]\par At the time had some c/o neck ache/stiffness, resolved.  walking was off, but better now. lasted one day.\par CT L fronto parietal, R parietal lobe.  wm changes\par \par PMHx:\par 43 y/o male with PMHX of Down Syndrome, hyperlipidemia, and ischemic stroke in 2015 evaluated at [facility] bilateral middle cerebral artery territory and cerebellar (cerebellar, right perisylvian temporoparietal, left fronto parietal) w/ complete right sided hemiplegia and left gaze deviation p TPA.  on ASA 81 mg and Atorvastatin 80mg daily. \par \par Had three events of loss of consciousness for which he was evaluated at [facility] Jan 28, 2019. none of the events where witnessed. During the afternoon he collapse and was unresponsive for minutes, losing bowel control. He apparently was taken for a shower and had two episodes of LOC in the shower that prompted a friend of the patient to call EMS. Patient does not recall the events/amnestic. \par Since January 2019 they denied any other neurological events. \par \par He has completed cardiac work up for syncope so far unrevealing.  Of note he had PFO repaired. LOOP recorder was discontinued. \par

## 2023-02-15 NOTE — DATA REVIEWED
[de-identified] : 2016 Rt posterior temporal, and left insular lesions. [de-identified] : FRITZG 2019 Mod background slowing, max right temporal.

## 2023-02-15 NOTE — DISCUSSION/SUMMARY
[FreeTextEntry1] : Down's syndrome.  \par Concern for cluster of seizures 1/2019 p strokes in 2015\par left insular and right temporo-parietal lesions on MRI, atrophy.\par ddx includes syncope.  cardiac w/u neg.\par REEG 7/2019 abn, bilateral slowing, but no spikes.\par CEEG x48hrs 9/2019 Abnormal EEG in the awake, drowsy and asleep states.\par - Multiple push-buttons without abnormal EEG correlate. \par - Mild generalized slowing, most prominent bi-temporal and more on the right compared the left\par REEG 9/2020 R frontal sharp waves described.\par \par Prior Episode in 2020 of hypoglyemia to 40's with AMS.  \par \par Some risk of cognitive decline due to Downs, stable MMSE ~21 of late.  No overt decline in last year.\par \par Cont LEV for now low dose\par Restrict unsupervised activities, such bathing alone.\par risk of death from seizures is present, though not predictable.\par discussed safety recommendations.\par compliance with Rx recommended.\par \par -No incontinence, no back pain, no AMS/memory decline.  Cognition stable.\par -MRI to evaluate for ventriculomegaly/NPH 1/2020 neg.\par -Mild chronic gait instability p stroke, some intermittent exacerbation as of late, right knee pain. MRI stable 1/2020.  PT requested.  Gait unsteadiness. Exercising\par \par Continue on ASA, lipitor, LEV.\par Wt loss.\par \par f/u 6mo\par  [Focal] : focal [Symptomatic] : symptomatic [Safety Recommendations] : The patient was advised in regards to the risk of seizures and general seizure safety recommendations including not to be bathing alone, climbing to high places and operating heavy machinery. [Compliance with Medications] : The importance of compliance with medications was reinforced. [Medication Side Effects] : High frequency and serious potential medication adverse effects were reviewed with the patient, including but not exclusive to psychiatric effects.  Information sheets on medication side effects were made available to the patient in our clinic.  The patient or advocate agrees to notify us for any concerns.

## 2023-02-15 NOTE — PHYSICAL EXAM
[FreeTextEntry1] : Neurologic examination:\par \par down's facies, calm, attentive, pleasant\par \par Mental status:  Orientation 4/5 time, 2/5 place (baseline)\par The patient is alert, attentive, and oriented. Speech is dysarthric but fluent with good repetition, comprehension, and naming 5/5.  spells WORLD DOlrD, unable to do backwards 1/5. reg 3/3, recall 2/3.  \par MMSE 21\par \par Cranial nerves:\par CN II: Visual fields difficult to asses. \par CN III, IV, VI: At primary gaze, there is no eye deviation.EOMI. No ptosis. \par CN V: Not tested \par CN VII: Face is symmetric with normal eye closure and smile.\par CN VII: Hearing is normal to voice tone\par CN IX, X: . Phonation is normal.\par CN XI: Head turning and shoulder shrug are intact\par CN XII: Tongue is midline with normal movements and no atrophy.\par \par Motor:\par There is no pronator drift of out-stretched arms. Muscle bulk and tone are normal. Strength is full bilaterally.\par 	Deltoid	Biceps	Triceps	Hand \par L	5	5	5	5	\par R	5	5	5	5		\par  Hip flexion Hip extension	Knee flexion Knee extension\par L	5	5	5		 5\par R	5	5	5	                 5\par \par Sensory:\par Light touch intact in all 4 extremities. \par \par Coordination: No nystagmus/ slowed HTS/HT dexterity bilat\par \par Gait/Stance:\par Posture is normal. Some eversion of foot while walking bilaterally.  Gait is slowed but steady with deliberate steps, base, arm swing, and turning.  Unable to tandem, +romberg\par \par DTR bi, knee 1+ symm, 0 ankles

## 2023-04-11 ENCOUNTER — RX RENEWAL (OUTPATIENT)
Age: 43
End: 2023-04-11

## 2023-05-11 ENCOUNTER — APPOINTMENT (OUTPATIENT)
Dept: PULMONOLOGY | Facility: CLINIC | Age: 43
End: 2023-05-11
Payer: MEDICARE

## 2023-05-11 VITALS
WEIGHT: 202.25 LBS | DIASTOLIC BLOOD PRESSURE: 76 MMHG | TEMPERATURE: 97.4 F | HEART RATE: 58 BPM | OXYGEN SATURATION: 97 % | HEIGHT: 64 IN | SYSTOLIC BLOOD PRESSURE: 115 MMHG | BODY MASS INDEX: 34.53 KG/M2 | RESPIRATION RATE: 15 BRPM

## 2023-05-11 DIAGNOSIS — R29.818 OTHER SYMPTOMS AND SIGNS INVOLVING THE NERVOUS SYSTEM: ICD-10-CM

## 2023-05-11 PROCEDURE — 99204 OFFICE O/P NEW MOD 45 MIN: CPT | Mod: GC

## 2023-05-11 NOTE — HISTORY OF PRESENT ILLNESS
[FreeTextEntry1] : Patient is a 43 y/o M with PMHx of trisomy 21, pAfib, CVA with no residual deficits, h/o seizures, HLD who presents for initial evaluation of suspected sleep apnea.  \par \par The patient denies any complaints with his sleep. Reports that he sleeps well at night and feels refreshed during the day. His aide reports some snoring at night.\par he was referred for evaluation of kong b/o his cardiovascular and cerebrovascular co-morbidity.\par ESS 0

## 2023-05-11 NOTE — ASSESSMENT
[FreeTextEntry1] : This is a 43 y/o M with PMHx of trisomy 21, pAfib, CVA with no residual deficits, h/o seizures, HLD, class I obesity who presents for initial evaluation of suspected sleep apnea. He denies any symptoms, however his aide reports snoring.\par \par 1) Suspected sleep apnea - The patient's trisomy 21 put him at high risk of obstructive sleep apnea, and the patient has several health risks related to sleep apnea including h/o stroke and Afib. Additionally, the patient's obesity puts him at increased risk. The patient was advised to come into the sleep lab for an in-lab PSG, however he is vehemently refusing and would greatly like to perform the test in his own bed. We have called his group home and confirmed with the  (Saroj Tee [phone]) that someone would be available to  an HSAT for the patient, and would be able to help him put it on and to watch him throughout the night, in order to ensure an adequate quality test.\par \par Follow up after sleep study.

## 2023-05-11 NOTE — PHYSICAL EXAM
[General Appearance - Well Developed] : well developed [Normal Appearance] : normal appearance [Well Groomed] : well groomed [General Appearance - Well Nourished] : well nourished [Low Lying Soft Palate] : low lying soft palate [Enlarged Base of the Tongue] : enlargement of the base of the tongue [Retrognathia] : retrognathia [IV] : IV [Neck Appearance] : the appearance of the neck was normal [Neck Cervical Mass (___cm)] : no neck mass was observed [Heart Rate And Rhythm] : heart rate was normal and rhythm regular [Heart Sounds] : normal S1 and S2 [Respiration, Rhythm And Depth] : normal respiratory rhythm and effort [Exaggerated Use Of Accessory Muscles For Inspiration] : no accessory muscle use [Auscultation Breath Sounds / Voice Sounds] : lungs were clear to auscultation bilaterally [Abnormal Walk] : normal gait [Nail Clubbing] : no clubbing  or cyanosis of the fingernails [Skin Color & Pigmentation] : normal skin color and pigmentation [] : no rash [No Focal Deficits] : no focal deficits [Affect] : the affect was normal [Mood] : the mood was normal

## 2023-06-12 ENCOUNTER — APPOINTMENT (OUTPATIENT)
Dept: SLEEP CENTER | Facility: CLINIC | Age: 43
End: 2023-06-12
Payer: MEDICARE

## 2023-06-12 ENCOUNTER — OUTPATIENT (OUTPATIENT)
Dept: OUTPATIENT SERVICES | Facility: HOSPITAL | Age: 43
LOS: 1 days | End: 2023-06-12
Payer: MEDICARE

## 2023-06-12 PROCEDURE — 95800 SLP STDY UNATTENDED: CPT | Mod: 26

## 2023-06-12 PROCEDURE — 95800 SLP STDY UNATTENDED: CPT

## 2023-06-21 DIAGNOSIS — G47.33 OBSTRUCTIVE SLEEP APNEA (ADULT) (PEDIATRIC): ICD-10-CM

## 2023-08-09 ENCOUNTER — APPOINTMENT (OUTPATIENT)
Dept: NEUROLOGY | Facility: CLINIC | Age: 43
End: 2023-08-09

## 2023-09-06 ENCOUNTER — APPOINTMENT (OUTPATIENT)
Dept: NEUROLOGY | Facility: CLINIC | Age: 43
End: 2023-09-06
Payer: MEDICARE

## 2023-09-06 ENCOUNTER — RX RENEWAL (OUTPATIENT)
Age: 43
End: 2023-09-06

## 2023-09-06 VITALS
HEART RATE: 60 BPM | WEIGHT: 196 LBS | BODY MASS INDEX: 33.46 KG/M2 | HEIGHT: 64 IN | SYSTOLIC BLOOD PRESSURE: 107 MMHG | DIASTOLIC BLOOD PRESSURE: 64 MMHG

## 2023-09-06 PROCEDURE — 99213 OFFICE O/P EST LOW 20 MIN: CPT

## 2023-09-06 NOTE — DISCUSSION/SUMMARY
[Focal] : focal [Symptomatic] : symptomatic [Safety Recommendations] : The patient was advised in regards to the risk of seizures and general seizure safety recommendations including not to be bathing alone, climbing to high places and operating heavy machinery. [Compliance with Medications] : The importance of compliance with medications was reinforced. [Medication Side Effects] : High frequency and serious potential medication adverse effects were reviewed with the patient, including but not exclusive to psychiatric effects.  Information sheets on medication side effects were made available to the patient in our clinic.  The patient or advocate agrees to notify us for any concerns. [FreeTextEntry1] : Down's syndrome.   Concern for cluster of seizures 1/2019 p strokes in 2015 left insular and right temporo-parietal lesions on MRI, atrophy. ddx includes syncope.  cardiac w/u neg. REEG 7/2019 abn, bilateral slowing, but no spikes. CEEG x48hrs 9/2019 Abnormal EEG in the awake, drowsy and asleep states. - Multiple push-buttons without abnormal EEG correlate.  - Mild generalized slowing, most prominent bi-temporal and more on the right compared the left REEG 9/2020 R frontal sharp waves described.  Prior Episode in 2020 of hypoglyemia to 40's with AMS.    Some risk of cognitive decline due to Downs, stable MMSE ~21 of late.  No overt decline in last year. Severe OLMAN.  A/P: Cont LEV for now low dose Restrict unsupervised activities, such bathing alone. risk of death from seizures is present, though not predictable. discussed safety recommendations. continued compliance with Rx discussed  -No incontinence, no back pain, no AMS/memory decline.  Cognition stable. -MRI to evaluate for ventriculomegaly/NPH was 1/2020 neg. -Mild chronic gait instability p stroke, some intermittent exacerbation as of late, right knee pain. MRI stable 1/2020.  PT requested.  Gait unsteadiness. Exercising  Continue on ASA, lipitor, LEV. Wt loss for OLMAN, f/u sleep specialist.  minimize carbohydrate intake recommended  f/u 6mo

## 2023-09-06 NOTE — DATA REVIEWED
[de-identified] : 2016 Rt posterior temporal, and left insular lesions. [de-identified] : FRITZG 2019 Mod background slowing, max right temporal.

## 2023-09-06 NOTE — HISTORY OF PRESENT ILLNESS
[FreeTextEntry1] : Rx:  ASA81, lipitor 40mg, LEV 500mg bid, vit D  update:   no seizures going to dayhab exercises daily, PT, walking.  Wt still elevated, with high BMI Diagnosed with severe OLMAN. Staff notes sometimes reluctant to walk.  Some abrupt facial expression. No seizures.  Somewhat chronically unsteady gait.   With mother and home care worker today.   No back pain, no pain in groin, leg motion/numbness issues. Mood ok. Memory ok, thinking stable per mother/pt/caregiver.  2020 Episode of hypoglycemia to 40's, went to [facility] At the time had some c/o neck ache/stiffness, resolved.  walking was off, but better now. lasted one day. CT L fronto parietal, R parietal lobe.  wm changes  PMHx: 43 y/o male with PMHX of Down Syndrome, hyperlipidemia, and ischemic stroke in 2015 evaluated at [facility] bilateral middle cerebral artery territory and cerebellar (cerebellar, right perisylvian temporoparietal, left fronto parietal) w/ complete right sided hemiplegia and left gaze deviation p TPA.  on ASA 81 mg and Atorvastatin 80mg daily.   Had three events of loss of consciousness for which he was evaluated at [facility] Jan 28, 2019. none of the events where witnessed. During the afternoon he collapse and was unresponsive for minutes, losing bowel control. He apparently was taken for a shower and had two episodes of LOC in the shower that prompted a friend of the patient to call EMS. Patient does not recall the events/amnestic.  Since January 2019 they denied any other neurological events.   He has completed cardiac work up for syncope so far unrevealing.  Of note he had PFO repaired. LOOP recorder was discontinued.

## 2024-04-10 ENCOUNTER — APPOINTMENT (OUTPATIENT)
Dept: NEUROLOGY | Facility: CLINIC | Age: 44
End: 2024-04-10
Payer: MEDICARE

## 2024-04-10 VITALS
HEART RATE: 56 BPM | WEIGHT: 196 LBS | DIASTOLIC BLOOD PRESSURE: 75 MMHG | HEIGHT: 64 IN | BODY MASS INDEX: 33.46 KG/M2 | SYSTOLIC BLOOD PRESSURE: 112 MMHG

## 2024-04-10 DIAGNOSIS — R56.9 UNSPECIFIED CONVULSIONS: ICD-10-CM

## 2024-04-10 DIAGNOSIS — G47.33 OBSTRUCTIVE SLEEP APNEA (ADULT) (PEDIATRIC): ICD-10-CM

## 2024-04-10 PROCEDURE — G2211 COMPLEX E/M VISIT ADD ON: CPT

## 2024-04-10 PROCEDURE — 99213 OFFICE O/P EST LOW 20 MIN: CPT

## 2024-04-10 RX ORDER — LEVETIRACETAM 500 MG/1
500 TABLET, FILM COATED ORAL TWICE DAILY
Qty: 60 | Refills: 5 | Status: ACTIVE | COMMUNITY
Start: 2019-05-29 | End: 1900-01-01

## 2024-04-10 NOTE — DISCUSSION/SUMMARY
[Focal] : focal [Symptomatic] : symptomatic [Safety Recommendations] : The patient was advised in regards to the risk of seizures and general seizure safety recommendations including not to be bathing alone, climbing to high places and operating heavy machinery. [Compliance with Medications] : The importance of compliance with medications was reinforced. [Medication Side Effects] : High frequency and serious potential medication adverse effects were reviewed with the patient, including but not exclusive to psychiatric effects.  Information sheets on medication side effects were made available to the patient in our clinic.  The patient or advocate agrees to notify us for any concerns. [FreeTextEntry1] : Down's syndrome.   Concern for cluster of seizures 1/2019 p strokes in 2015 MRI to evaluate for ventriculomegaly/NPH was 1/2020 neg. Chronic left insular and right temporo-parietal lesions on MRI, atrophy. Ddx includes syncope.  cardiac w/u neg. REEG 7/2019 abn, bilateral slowing, but no spikes. CEEG x48hrs 9/2019 Abnormal EEG in the awake, drowsy and asleep states. - Multiple push-buttons without abnormal EEG correlate.  - Mild generalized slowing, most prominent bi-temporal and more on the right compared the left REEG 9/2020 R frontal sharp waves described.  Prior Episode in 2020 of hypoglyemia to 40's with AMS.    Some risk of cognitive decline due to Downs, stable MMSE ~21 of late.  No overt decline in last year. Severe OLMAN.  A/P: -Cont LEV low dose for now  -Continued compliance with Rx discussed -Discussed safety recommendations. -SUDEP/risk of death from seizures is theoretically present, though not predictable. -No incontinence, no back pain, no AMS/memory decline.  Cognition stable. -Mild chronic gait instability p stroke, some intermittent exacerbation as of late, right knee pain. MRI stable 1/2020.  PT requested.  Gait unsteadiness. Exercising  Continue on ASA, lipitor, LEV. Wt loss for OLMAN, f/u sleep specialist.  minimize carbohydrate intake recommended  -Medical alert bracelet when outside or unsupervised seems reasonable/recommended. -Restrict bathing alone.   -Can maintain current level of supervision. Able to be unsupervised with friends, can be alone outside home for 30min.  f/u 6mo

## 2024-04-10 NOTE — DATA REVIEWED
[de-identified] : 2016 Rt posterior temporal, and left insular lesions. [de-identified] : FRITZG 2019 Mod background slowing, max right temporal.

## 2024-04-10 NOTE — HISTORY OF PRESENT ILLNESS
[FreeTextEntry1] : Rx:  ASA81, lipitor 40mg, LEV 500mg bid, vit D  update:   no seizures going to dayhab exercises daily, PT, walking.  Wt still elevated, slight wt loss, with high BMI Diagnosed with severe OLMAN.  Some lessening of daytime fatigue Staff notes sometimes reluctant to walk.  Some abrupt facial expression. No seizures.  Somewhat chronically unsteady gait.   With mother and home care worker today.   No back pain, no pain in groin, leg motion/numbness issues. Mood ok. Memory ok, thinking stable per mother/pt/caregiver.  2020 Episode of hypoglycemia to 40's, went to [facility] At the time had some c/o neck ache/stiffness, resolved.  walking was off, but better now. lasted one day. CT L fronto parietal, R parietal lobe.  wm changes  PMHx: 44 y/o male with PMHX of Down Syndrome, hyperlipidemia, and ischemic stroke in 2015 evaluated at [facility] bilateral middle cerebral artery territory and cerebellar (cerebellar, right perisylvian temporoparietal, left fronto parietal) w/ complete right sided hemiplegia and left gaze deviation p TPA.  on ASA 81 mg and Atorvastatin 80mg daily.   Had three events of loss of consciousness for which he was evaluated at [facility] Jan 28, 2019. none of the events where witnessed. During the afternoon he collapse and was unresponsive for minutes, losing bowel control. He apparently was taken for a shower and had two episodes of LOC in the shower that prompted a friend of the patient to call EMS. Patient does not recall the events/amnestic.  Since January 2019 they denied any other neurological events.   He has completed cardiac work up for syncope so far unrevealing.  Of note he had PFO repaired. LOOP recorder was discontinued.

## 2024-10-28 ENCOUNTER — NON-APPOINTMENT (OUTPATIENT)
Age: 44
End: 2024-10-28

## 2024-10-28 ENCOUNTER — APPOINTMENT (OUTPATIENT)
Dept: NEUROLOGY | Facility: CLINIC | Age: 44
End: 2024-10-28
Payer: MEDICARE

## 2024-10-28 VITALS
BODY MASS INDEX: 33.12 KG/M2 | DIASTOLIC BLOOD PRESSURE: 69 MMHG | HEIGHT: 64 IN | SYSTOLIC BLOOD PRESSURE: 108 MMHG | HEART RATE: 63 BPM | WEIGHT: 194 LBS

## 2024-10-28 VITALS
WEIGHT: 192 LBS | DIASTOLIC BLOOD PRESSURE: 69 MMHG | HEIGHT: 64 IN | HEART RATE: 53 BPM | BODY MASS INDEX: 32.78 KG/M2 | SYSTOLIC BLOOD PRESSURE: 108 MMHG

## 2024-10-28 DIAGNOSIS — G47.33 OBSTRUCTIVE SLEEP APNEA (ADULT) (PEDIATRIC): ICD-10-CM

## 2024-10-28 DIAGNOSIS — G47.9 SLEEP DISORDER, UNSPECIFIED: ICD-10-CM

## 2024-10-28 DIAGNOSIS — R56.9 UNSPECIFIED CONVULSIONS: ICD-10-CM

## 2024-10-28 PROCEDURE — 99214 OFFICE O/P EST MOD 30 MIN: CPT

## 2024-10-28 PROCEDURE — G2211 COMPLEX E/M VISIT ADD ON: CPT

## 2025-03-24 ENCOUNTER — RX RENEWAL (OUTPATIENT)
Age: 45
End: 2025-03-24

## 2025-06-09 ENCOUNTER — NON-APPOINTMENT (OUTPATIENT)
Age: 45
End: 2025-06-09

## 2025-06-10 ENCOUNTER — APPOINTMENT (OUTPATIENT)
Dept: NEUROLOGY | Facility: CLINIC | Age: 45
End: 2025-06-10
Payer: MEDICARE

## 2025-06-10 VITALS
HEART RATE: 47 BPM | HEIGHT: 62.5 IN | SYSTOLIC BLOOD PRESSURE: 125 MMHG | BODY MASS INDEX: 32.65 KG/M2 | DIASTOLIC BLOOD PRESSURE: 80 MMHG | WEIGHT: 182 LBS

## 2025-06-10 PROCEDURE — G2211 COMPLEX E/M VISIT ADD ON: CPT

## 2025-06-10 PROCEDURE — 99214 OFFICE O/P EST MOD 30 MIN: CPT
